# Patient Record
Sex: FEMALE | Race: BLACK OR AFRICAN AMERICAN | NOT HISPANIC OR LATINO | Employment: UNEMPLOYED | URBAN - METROPOLITAN AREA
[De-identification: names, ages, dates, MRNs, and addresses within clinical notes are randomized per-mention and may not be internally consistent; named-entity substitution may affect disease eponyms.]

---
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
## 2021-04-09 ENCOUNTER — OFFICE VISIT (OUTPATIENT)
Dept: URGENT CARE | Facility: CLINIC | Age: 21
End: 2021-04-09
Payer: MEDICAID

## 2021-04-09 VITALS
SYSTOLIC BLOOD PRESSURE: 100 MMHG | HEIGHT: 65 IN | HEART RATE: 80 BPM | OXYGEN SATURATION: 98 % | BODY MASS INDEX: 17.49 KG/M2 | TEMPERATURE: 98 F | WEIGHT: 105 LBS | DIASTOLIC BLOOD PRESSURE: 70 MMHG | RESPIRATION RATE: 16 BRPM

## 2021-04-09 DIAGNOSIS — B37.0 ORAL THRUSH: Primary | ICD-10-CM

## 2021-04-09 DIAGNOSIS — Z76.89 ENCOUNTER TO ESTABLISH CARE: ICD-10-CM

## 2021-04-09 PROCEDURE — 99203 PR OFFICE/OUTPT VISIT, NEW, LEVL III, 30-44 MIN: ICD-10-PCS | Mod: S$GLB,,, | Performed by: NURSE PRACTITIONER

## 2021-04-09 PROCEDURE — 99203 OFFICE O/P NEW LOW 30 MIN: CPT | Mod: S$GLB,,, | Performed by: NURSE PRACTITIONER

## 2021-04-09 RX ORDER — FLUCONAZOLE 200 MG/1
200 TABLET ORAL DAILY
COMMUNITY
Start: 2021-04-06 | End: 2021-06-11

## 2021-04-09 RX ORDER — DROSPIRENONE AND ETHINYL ESTRADIOL 0.03MG-3MG
1 KIT ORAL DAILY
COMMUNITY
Start: 2021-03-01

## 2021-04-09 RX ORDER — FLUCONAZOLE 150 MG/1
150 TABLET ORAL DAILY
Qty: 1 TABLET | Refills: 0 | Status: SHIPPED | OUTPATIENT
Start: 2021-04-09 | End: 2021-04-10

## 2021-04-09 RX ORDER — CLOTRIMAZOLE 10 MG/1
10 LOZENGE ORAL; TOPICAL DAILY
Qty: 15 TABLET | Refills: 0 | Status: SHIPPED | OUTPATIENT
Start: 2021-04-09 | End: 2021-04-24

## 2021-04-20 ENCOUNTER — OFFICE VISIT (OUTPATIENT)
Dept: URGENT CARE | Facility: CLINIC | Age: 21
End: 2021-04-20
Payer: MEDICAID

## 2021-04-20 VITALS
TEMPERATURE: 98 F | OXYGEN SATURATION: 98 % | BODY MASS INDEX: 16.66 KG/M2 | HEART RATE: 100 BPM | RESPIRATION RATE: 18 BRPM | HEIGHT: 65 IN | DIASTOLIC BLOOD PRESSURE: 70 MMHG | SYSTOLIC BLOOD PRESSURE: 126 MMHG | WEIGHT: 100 LBS

## 2021-04-20 DIAGNOSIS — B37.0 ORAL THRUSH: ICD-10-CM

## 2021-04-20 DIAGNOSIS — R45.89 ANXIETY ABOUT HEALTH: Primary | ICD-10-CM

## 2021-04-20 PROCEDURE — 99212 OFFICE O/P EST SF 10 MIN: CPT | Mod: S$GLB,,, | Performed by: NURSE PRACTITIONER

## 2021-04-20 PROCEDURE — 99212 PR OFFICE/OUTPT VISIT, EST, LEVL II, 10-19 MIN: ICD-10-PCS | Mod: S$GLB,,, | Performed by: NURSE PRACTITIONER

## 2021-06-11 ENCOUNTER — HOSPITAL ENCOUNTER (EMERGENCY)
Facility: OTHER | Age: 21
Discharge: HOME | End: 2021-06-11
Attending: EMERGENCY MEDICINE
Payer: MEDICAID

## 2021-06-11 VITALS
WEIGHT: 106 LBS | HEIGHT: 65 IN | SYSTOLIC BLOOD PRESSURE: 100 MMHG | DIASTOLIC BLOOD PRESSURE: 60 MMHG | BODY MASS INDEX: 17.66 KG/M2 | HEART RATE: 70 BPM | RESPIRATION RATE: 20 BRPM | TEMPERATURE: 98 F | OXYGEN SATURATION: 100 %

## 2021-06-11 DIAGNOSIS — B37.0 ORAL THRUSH: Primary | ICD-10-CM

## 2021-06-11 LAB
ALBUMIN SERPL BCP-MCNC: 3.9 G/DL (ref 3.5–5.2)
ALP SERPL-CCNC: 50 U/L (ref 55–135)
ALT SERPL W/O P-5'-P-CCNC: 14 U/L (ref 10–44)
ANION GAP SERPL CALC-SCNC: 9 MMOL/L (ref 8–16)
AST SERPL-CCNC: 17 U/L (ref 10–40)
B-HCG UR QL: NEGATIVE
BASOPHILS # BLD AUTO: 0.03 K/UL (ref 0–0.2)
BASOPHILS NFR BLD: 0.7 % (ref 0–1.9)
BILIRUB SERPL-MCNC: 0.3 MG/DL (ref 0.1–1)
BUN SERPL-MCNC: 11 MG/DL (ref 6–20)
CALCIUM SERPL-MCNC: 8.5 MG/DL (ref 8.7–10.5)
CHLORIDE SERPL-SCNC: 108 MMOL/L (ref 95–110)
CO2 SERPL-SCNC: 24 MMOL/L (ref 23–29)
CREAT SERPL-MCNC: 0.7 MG/DL (ref 0.5–1.4)
CTP QC/QA: YES
DIFFERENTIAL METHOD: ABNORMAL
EOSINOPHIL # BLD AUTO: 0.2 K/UL (ref 0–0.5)
EOSINOPHIL NFR BLD: 4 % (ref 0–8)
ERYTHROCYTE [DISTWIDTH] IN BLOOD BY AUTOMATED COUNT: 13.8 % (ref 11.5–14.5)
EST. GFR  (AFRICAN AMERICAN): >60 ML/MIN/1.73 M^2
EST. GFR  (NON AFRICAN AMERICAN): >60 ML/MIN/1.73 M^2
GLUCOSE SERPL-MCNC: 79 MG/DL (ref 70–110)
HCT VFR BLD AUTO: 32.6 % (ref 37–48.5)
HGB BLD-MCNC: 10.5 G/DL (ref 12–16)
HIV 1+2 AB+HIV1 P24 AG SERPL QL IA: NEGATIVE
IMM GRANULOCYTES # BLD AUTO: 0.01 K/UL (ref 0–0.04)
IMM GRANULOCYTES NFR BLD AUTO: 0.2 % (ref 0–0.5)
LYMPHOCYTES # BLD AUTO: 1.9 K/UL (ref 1–4.8)
LYMPHOCYTES NFR BLD: 42.6 % (ref 18–48)
MCH RBC QN AUTO: 30.5 PG (ref 27–31)
MCHC RBC AUTO-ENTMCNC: 32.2 G/DL (ref 32–36)
MCV RBC AUTO: 95 FL (ref 82–98)
MONOCYTES # BLD AUTO: 0.4 K/UL (ref 0.3–1)
MONOCYTES NFR BLD: 7.8 % (ref 4–15)
NEUTROPHILS # BLD AUTO: 2 K/UL (ref 1.8–7.7)
NEUTROPHILS NFR BLD: 44.7 % (ref 38–73)
NRBC BLD-RTO: 0 /100 WBC
PLATELET # BLD AUTO: 173 K/UL (ref 150–450)
PMV BLD AUTO: 10.3 FL (ref 9.2–12.9)
POTASSIUM SERPL-SCNC: 3.6 MMOL/L (ref 3.5–5.1)
PROT SERPL-MCNC: 7.1 G/DL (ref 6–8.4)
RBC # BLD AUTO: 3.44 M/UL (ref 4–5.4)
SODIUM SERPL-SCNC: 141 MMOL/L (ref 136–145)
WBC # BLD AUTO: 4.48 K/UL (ref 3.9–12.7)

## 2021-06-11 PROCEDURE — 36415 COLL VENOUS BLD VENIPUNCTURE: CPT | Performed by: INTERNAL MEDICINE

## 2021-06-11 PROCEDURE — 80053 COMPREHEN METABOLIC PANEL: CPT | Performed by: INTERNAL MEDICINE

## 2021-06-11 PROCEDURE — 85025 COMPLETE CBC W/AUTO DIFF WBC: CPT | Performed by: INTERNAL MEDICINE

## 2021-06-11 PROCEDURE — 99284 EMERGENCY DEPT VISIT MOD MDM: CPT | Mod: 25

## 2021-06-11 PROCEDURE — 81025 URINE PREGNANCY TEST: CPT | Performed by: INTERNAL MEDICINE

## 2021-06-11 PROCEDURE — 86703 HIV-1/HIV-2 1 RESULT ANTBDY: CPT | Performed by: INTERNAL MEDICINE

## 2021-06-11 PROCEDURE — 63700000 PHARM REV CODE 250 ALT 637 W/O HCPCS: Performed by: INTERNAL MEDICINE

## 2021-06-11 PROCEDURE — 25000003 PHARM REV CODE 250: Performed by: INTERNAL MEDICINE

## 2021-06-11 RX ORDER — NYSTATIN 100000 [USP'U]/ML
500000 SUSPENSION ORAL 2 TIMES DAILY
Qty: 70 ML | Refills: 0 | Status: SHIPPED | OUTPATIENT
Start: 2021-06-11 | End: 2021-06-18

## 2021-06-11 RX ORDER — FLUCONAZOLE 100 MG/1
200 TABLET ORAL
Status: COMPLETED | OUTPATIENT
Start: 2021-06-11 | End: 2021-06-11

## 2021-06-11 RX ORDER — NYSTATIN 100000 [USP'U]/ML
500000 SUSPENSION ORAL
Status: COMPLETED | OUTPATIENT
Start: 2021-06-11 | End: 2021-06-11

## 2021-06-11 RX ORDER — FLUCONAZOLE 200 MG/1
200 TABLET ORAL DAILY
Qty: 7 TABLET | Refills: 0 | Status: SHIPPED | OUTPATIENT
Start: 2021-06-11 | End: 2021-06-18

## 2021-06-11 RX ADMIN — NYSTATIN 500000 UNITS: 100000 SUSPENSION ORAL at 07:06

## 2021-06-11 RX ADMIN — FLUCONAZOLE 200 MG: 100 TABLET ORAL at 07:06

## 2021-06-20 ENCOUNTER — OFFICE VISIT (OUTPATIENT)
Dept: URGENT CARE | Facility: CLINIC | Age: 21
End: 2021-06-20
Payer: MEDICAID

## 2021-06-20 VITALS
HEART RATE: 94 BPM | HEIGHT: 65 IN | WEIGHT: 106 LBS | BODY MASS INDEX: 17.66 KG/M2 | RESPIRATION RATE: 20 BRPM | SYSTOLIC BLOOD PRESSURE: 111 MMHG | TEMPERATURE: 98 F | OXYGEN SATURATION: 97 % | DIASTOLIC BLOOD PRESSURE: 77 MMHG

## 2021-06-20 DIAGNOSIS — B37.0 ORAL THRUSH: ICD-10-CM

## 2021-06-20 DIAGNOSIS — Z76.89 ENCOUNTER TO ESTABLISH CARE: Primary | ICD-10-CM

## 2021-06-20 DIAGNOSIS — R45.89 ANXIETY ABOUT HEALTH: ICD-10-CM

## 2021-06-20 PROCEDURE — 99214 PR OFFICE/OUTPT VISIT, EST, LEVL IV, 30-39 MIN: ICD-10-PCS | Mod: S$GLB,,, | Performed by: NURSE PRACTITIONER

## 2021-06-20 PROCEDURE — 99214 OFFICE O/P EST MOD 30 MIN: CPT | Mod: S$GLB,,, | Performed by: NURSE PRACTITIONER

## 2021-06-26 ENCOUNTER — HOSPITAL ENCOUNTER (EMERGENCY)
Facility: OTHER | Age: 21
Discharge: HOME | End: 2021-06-26
Attending: EMERGENCY MEDICINE
Payer: MEDICAID

## 2021-06-26 VITALS
TEMPERATURE: 97 F | BODY MASS INDEX: 18.99 KG/M2 | HEIGHT: 65 IN | WEIGHT: 114 LBS | SYSTOLIC BLOOD PRESSURE: 124 MMHG | OXYGEN SATURATION: 100 % | RESPIRATION RATE: 18 BRPM | DIASTOLIC BLOOD PRESSURE: 67 MMHG | HEART RATE: 90 BPM

## 2021-06-26 DIAGNOSIS — Z32.02 NEGATIVE PREGNANCY TEST: Primary | ICD-10-CM

## 2021-06-26 LAB
B-HCG UR QL: NEGATIVE
CTP QC/QA: YES

## 2021-06-26 PROCEDURE — 81025 URINE PREGNANCY TEST: CPT | Performed by: EMERGENCY MEDICINE

## 2021-06-26 PROCEDURE — 99282 EMERGENCY DEPT VISIT SF MDM: CPT

## 2021-08-13 ENCOUNTER — OFFICE VISIT (OUTPATIENT)
Dept: URGENT CARE | Facility: CLINIC | Age: 21
End: 2021-08-13
Payer: MEDICAID

## 2021-08-13 VITALS
DIASTOLIC BLOOD PRESSURE: 70 MMHG | HEART RATE: 80 BPM | TEMPERATURE: 98 F | RESPIRATION RATE: 18 BRPM | OXYGEN SATURATION: 98 % | SYSTOLIC BLOOD PRESSURE: 121 MMHG

## 2021-08-13 DIAGNOSIS — B37.0 ORAL CANDIDIASIS: Primary | ICD-10-CM

## 2021-08-13 PROCEDURE — 99213 OFFICE O/P EST LOW 20 MIN: CPT | Mod: S$GLB,,, | Performed by: NURSE PRACTITIONER

## 2021-08-13 PROCEDURE — 99213 PR OFFICE/OUTPT VISIT, EST, LEVL III, 20-29 MIN: ICD-10-PCS | Mod: S$GLB,,, | Performed by: NURSE PRACTITIONER

## 2021-08-13 RX ORDER — FLUCONAZOLE 200 MG/1
200 TABLET ORAL WEEKLY
Qty: 4 TABLET | Refills: 0 | Status: SHIPPED | OUTPATIENT
Start: 2021-08-13 | End: 2021-09-04

## 2021-08-18 ENCOUNTER — TELEPHONE (OUTPATIENT)
Dept: URGENT CARE | Facility: CLINIC | Age: 21
End: 2021-08-18

## 2021-08-18 DIAGNOSIS — K20.90 ESOPHAGITIS: Primary | ICD-10-CM

## 2021-09-25 ENCOUNTER — HOSPITAL ENCOUNTER (EMERGENCY)
Facility: OTHER | Age: 21
Discharge: HOME | End: 2021-09-25
Attending: EMERGENCY MEDICINE
Payer: MEDICAID

## 2021-09-25 VITALS
BODY MASS INDEX: 19.99 KG/M2 | WEIGHT: 120 LBS | DIASTOLIC BLOOD PRESSURE: 83 MMHG | HEART RATE: 95 BPM | RESPIRATION RATE: 18 BRPM | HEIGHT: 65 IN | OXYGEN SATURATION: 98 % | SYSTOLIC BLOOD PRESSURE: 125 MMHG | TEMPERATURE: 99 F

## 2021-09-25 DIAGNOSIS — B37.0 THRUSH: Primary | ICD-10-CM

## 2021-09-25 PROCEDURE — 99284 EMERGENCY DEPT VISIT MOD MDM: CPT

## 2021-09-25 RX ORDER — TRIAMCINOLONE ACETONIDE 1 MG/G
CREAM TOPICAL 2 TIMES DAILY
Qty: 1 TUBE | Refills: 0 | Status: SHIPPED | OUTPATIENT
Start: 2021-09-25 | End: 2021-10-02

## 2021-09-25 RX ORDER — FLUCONAZOLE 100 MG/1
100 TABLET ORAL DAILY
Qty: 7 TABLET | Refills: 0 | Status: SHIPPED | OUTPATIENT
Start: 2021-09-25 | End: 2021-10-02

## 2021-09-25 RX ORDER — TRIAMCINOLONE ACETONIDE 1 MG/G
CREAM TOPICAL 2 TIMES DAILY
Qty: 1 TUBE | Refills: 0 | Status: SHIPPED | OUTPATIENT
Start: 2021-09-25 | End: 2021-09-25 | Stop reason: SDUPTHER

## 2021-09-25 RX ORDER — FLUCONAZOLE 100 MG/1
100 TABLET ORAL DAILY
Qty: 7 TABLET | Refills: 0 | Status: SHIPPED | OUTPATIENT
Start: 2021-09-25 | End: 2021-09-25 | Stop reason: SDUPTHER

## 2022-09-02 ENCOUNTER — HOSPITAL ENCOUNTER (EMERGENCY)
Facility: HOSPITAL | Age: 22
Discharge: HOME | End: 2022-09-02
Attending: EMERGENCY MEDICINE
Payer: MEDICAID

## 2022-09-02 VITALS
OXYGEN SATURATION: 100 % | TEMPERATURE: 99 F | DIASTOLIC BLOOD PRESSURE: 81 MMHG | RESPIRATION RATE: 22 BRPM | HEART RATE: 100 BPM | SYSTOLIC BLOOD PRESSURE: 123 MMHG

## 2022-09-02 DIAGNOSIS — N76.0 BV (BACTERIAL VAGINOSIS): Primary | ICD-10-CM

## 2022-09-02 DIAGNOSIS — B96.89 BV (BACTERIAL VAGINOSIS): Primary | ICD-10-CM

## 2022-09-02 LAB
B-HCG UR QL: NEGATIVE
BACTERIA GENITAL QL WET PREP: ABNORMAL
BILIRUB UR QL STRIP: NEGATIVE
C TRACH DNA SPEC QL NAA+PROBE: NOT DETECTED
CLARITY UR REFRACT.AUTO: CLEAR
CLUE CELLS VAG QL WET PREP: ABNORMAL
COLOR UR AUTO: YELLOW
CTP QC/QA: YES
FILAMENT FUNGI VAG WET PREP-#/AREA: ABNORMAL
GLUCOSE UR QL STRIP: NEGATIVE
HGB UR QL STRIP: NEGATIVE
KETONES UR QL STRIP: NEGATIVE
LEUKOCYTE ESTERASE UR QL STRIP: NEGATIVE
N GONORRHOEA DNA SPEC QL NAA+PROBE: NOT DETECTED
NITRITE UR QL STRIP: NEGATIVE
PH UR STRIP: 7 [PH] (ref 5–8)
PROT UR QL STRIP: NEGATIVE
SP GR UR STRIP: 1.02 (ref 1–1.03)
SPECIMEN SOURCE: ABNORMAL
T VAGINALIS GENITAL QL WET PREP: ABNORMAL
URN SPEC COLLECT METH UR: NORMAL
WBC #/AREA VAG WET PREP: ABNORMAL
YEAST GENITAL QL WET PREP: ABNORMAL

## 2022-09-02 PROCEDURE — 81003 URINALYSIS AUTO W/O SCOPE: CPT | Performed by: EMERGENCY MEDICINE

## 2022-09-02 PROCEDURE — 99284 PR EMERGENCY DEPT VISIT,LEVEL IV: ICD-10-PCS | Mod: ,,, | Performed by: EMERGENCY MEDICINE

## 2022-09-02 PROCEDURE — 87210 SMEAR WET MOUNT SALINE/INK: CPT

## 2022-09-02 PROCEDURE — 87491 CHLMYD TRACH DNA AMP PROBE: CPT

## 2022-09-02 PROCEDURE — 81025 URINE PREGNANCY TEST: CPT | Performed by: EMERGENCY MEDICINE

## 2022-09-02 PROCEDURE — 87591 N.GONORRHOEAE DNA AMP PROB: CPT

## 2022-09-02 PROCEDURE — 99284 EMERGENCY DEPT VISIT MOD MDM: CPT | Mod: ,,, | Performed by: EMERGENCY MEDICINE

## 2022-09-02 PROCEDURE — 99284 EMERGENCY DEPT VISIT MOD MDM: CPT

## 2022-09-02 RX ORDER — METRONIDAZOLE 500 MG/1
500 TABLET ORAL EVERY 12 HOURS
Qty: 14 TABLET | Refills: 0 | Status: SHIPPED | OUTPATIENT
Start: 2022-09-02 | End: 2022-09-09
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
## 2022-09-02 NOTE — DISCHARGE INSTRUCTIONS
Diagnosis:  Bacterial vaginosis    Take the prescribed medicine, called Flagyl or metronidazole, as directed for bacterial vaginosis.  Do not drink alcohol while taking this medication as it can make you violently ill.      Follow-Up Plan:  - Follow-up with primary care doctor within 3 - 5 days  - Additional testing and/or evaluation as directed by your primary doctor    Return to the Emergency Department for symptoms including but not limited to: worsening symptoms, shortness of breath or chest pain, vomiting with inability to hold down fluids, fevers greater than 100.4°F, passing out/fainting/unconsciousness, or other concerning symptoms.

## 2022-09-02 NOTE — ED PROVIDER NOTES
"Encounter Date: 9/2/2022       History     Chief Complaint   Patient presents with    Vaginal Bleeding     Arrived by ems from home c/o gradual worsening vaginal bleeding since 11pm with associated "aching/burning sensation."      Patient presents with:  Vaginal Bleeding: Arrived by ems from home c/o gradual worsening vaginal bleeding since 11pm with associated "aching/burning sensation."       Rebecca Cheek is a 22 y.o. female presenting [place] [facility] [place]uria.  Patient states that pain woke her up from sleep around 11:00 p.m. on 09/01/2022.  Describes a aching/burning sensation in her groin area.  States that she has had minimal spotting of blood and hematuria/dysuria.  Patient denies fevers.  Patient describes right upper quadrant/right lower quadrant pain.  Patient denies any fevers.  Patient has significant concern about how she feels short of breath around vape smoke.  Additionally, patient reports that she gets a lot of yeast infections.  Stating that she will get a yeast infection when she passes by someone smoking weed.      Review of patient's allergies indicates:  No Known Allergies  History reviewed. No pertinent past medical history.  No past surgical history on file.  Family History   Problem Relation Age of Onset    No Known Problems Mother     No Known Problems Father      Social History     Tobacco Use    Smoking status: Never    Smokeless tobacco: Never   Substance Use Topics    Alcohol use: Yes     Review of Systems   Constitutional:  Negative for chills and fever.   HENT:  Negative for congestion, ear pain, rhinorrhea and sore throat.    Eyes:  Negative for visual disturbance.   Respiratory:  Negative for cough, chest tightness and shortness of breath.    Cardiovascular:  Negative for chest pain and leg swelling.   Gastrointestinal:  Positive for nausea. Negative for abdominal distention, abdominal pain, constipation, diarrhea and vomiting.   Endocrine: Negative for polyuria.   Genitourinary:  " Positive for dysuria and hematuria.   Musculoskeletal:  Negative for myalgias.   Skin:  Negative for color change and rash.   Neurological:  Negative for light-headedness and headaches.   Psychiatric/Behavioral:  Negative for decreased concentration. The patient is not nervous/anxious.      Physical Exam     Initial Vitals [09/02/22 0317]   BP Pulse Resp Temp SpO2   123/81 100 (!) 22 99.1 °F (37.3 °C) 100 %      MAP       --         Physical Exam    Constitutional: Vital signs are normal. She appears well-developed and well-nourished. She is cooperative.   HENT:   Head: Normocephalic and atraumatic.   Eyes: Conjunctivae and EOM are normal. Pupils are equal, round, and reactive to light.   Neck: Trachea normal and phonation normal. Neck supple. No tracheal deviation present.   Cardiovascular:  Normal rate, regular rhythm, normal heart sounds, intact distal pulses and normal pulses.     Exam reveals no gallop, no S3, no S4 and no friction rub.       No murmur heard.  Pulmonary/Chest: Breath sounds normal. No respiratory distress. She has no wheezes.   Abdominal: Abdomen is soft. She exhibits no distension. There is no abdominal tenderness.   Genitourinary: Cervix exhibits discharge. Cervix exhibits no motion tenderness. Right adnexum displays no tenderness. Left adnexum displays no tenderness.    Vaginal discharge present.      Genitourinary Comments: Copious thin white exudate coming from the cervical os and around the labia minora.     Musculoskeletal:      Cervical back: Neck supple.      Comments: Extremities atraumatic x4.  Normal gait.  No clubbing, cyanosis, edema.     Neurological: She is alert and oriented to person, place, and time. No cranial nerve deficit or sensory deficit. GCS eye subscore is 4. GCS verbal subscore is 5. GCS motor subscore is 6.   Skin: Skin is warm, dry and intact. Capillary refill takes less than 2 seconds.   Psychiatric: She has a normal mood and affect. Her speech is normal and  behavior is normal. Thought content normal.       ED Course   Procedures  Labs Reviewed   VAGINAL SCREEN - Abnormal; Notable for the following components:       Result Value    Clue Cells Few (*)     Bacteria - Vaginal Screen Moderate (*)     All other components within normal limits    Narrative:     Release to patient->Immediate   C. TRACHOMATIS/N. GONORRHOEAE BY AMP DNA   URINALYSIS, REFLEX TO URINE CULTURE    Narrative:     Specimen Source->Urine   HIV 1 / 2 ANTIBODY   HEPATITIS C ANTIBODY   POCT URINE PREGNANCY          Imaging Results    None          Medications - No data to display  Medical Decision Making:   Initial Assessment:   Rebecca Cheek is a 22 y.o. female presenting to [facility] ED for dysuria.  Initially, patient is hemodynamically stable and clinically well appearing.  Differential Diagnosis:   Yeast infection, STI, UTI  ED Management:  Patient's symptoms of burning in her vaginal area and dysuria are consistent with BV.  Urinalysis is not concerning for UTI.  Patient is not pregnant.  Vaginal screen showing clue cells and few bacteria.  Will prescribe patient prescription for 7 day course of metronidazole.  Counseled patient about not taking metronidazole with alcohol or she will have a severe reaction.  Discussed return precautions, patient is agreeable and voices understanding.  Patient is hemodynamically stable and appropriate for discharge at this time.          Attending Attestation:   Physician Attestation Statement for Resident:  As the supervising MD   Physician Attestation Statement: I have personally seen and examined this patient.   I agree with the above history.  -:   As the supervising MD I agree with the above PE.     As the supervising MD I agree with the above treatment, course, plan, and disposition.    I have reviewed and agree with the residents interpretation of the following: lab data.               ED Course as of 09/08/22 1549   Fri Sep 02, 2022   0540 Urinalysis, Reflex to  Urine Culture Urine, Clean Catch  Normal [ES]   0540 POCT urine pregnancy  Negative [ES]   0625 Vaginal Screen(!)  Vaginal screen is reflective of bacterial vaginosis.  Will treat with metronidazole   [ES]      ED Course User Index  [ES] Angie Lockwood MD               Clinical Impression:   Final diagnoses:  [N76.0, B96.89] BV (bacterial vaginosis) (Primary)      ED Disposition Condition    Discharge Stable          ED Prescriptions       Medication Sig Dispense Start Date End Date Auth. Provider    metroNIDAZOLE (FLAGYL) 500 MG tablet Take 1 tablet (500 mg total) by mouth every 12 (twelve) hours. for 7 days 14 tablet 9/2/2022 9/9/2022 Angie Lockwood MD          Follow-up Information    None          Angie Lockwood MD  Resident  09/02/22 [id number]       Amrik Hartley MD  09/08/22 8130

## 2024-06-15 ENCOUNTER — HOSPITAL ENCOUNTER (EMERGENCY)
Facility: HOSPITAL | Age: 24
Discharge: HOME | End: 2024-06-16
Attending: EMERGENCY MEDICINE
Payer: MEDICAID

## 2024-06-15 VITALS
RESPIRATION RATE: 18 BRPM | SYSTOLIC BLOOD PRESSURE: 125 MMHG | DIASTOLIC BLOOD PRESSURE: 84 MMHG | HEART RATE: 77 BPM | OXYGEN SATURATION: 100 % | TEMPERATURE: 98 F

## 2024-06-15 DIAGNOSIS — N92.6 ABNORMAL BLEEDING IN MENSTRUAL CYCLE: Primary | ICD-10-CM

## 2024-06-15 LAB
ALBUMIN SERPL BCP-MCNC: 4 G/DL (ref 3.5–5.2)
ALP SERPL-CCNC: 60 U/L (ref 55–135)
ALT SERPL W/O P-5'-P-CCNC: 27 U/L (ref 10–44)
ANION GAP SERPL CALC-SCNC: 11 MMOL/L (ref 8–16)
AST SERPL-CCNC: 52 U/L (ref 10–40)
B-HCG UR QL: NEGATIVE
BASOPHILS # BLD AUTO: 0.03 K/UL (ref 0–0.2)
BASOPHILS NFR BLD: 0.5 % (ref 0–1.9)
BILIRUB SERPL-MCNC: 0.5 MG/DL (ref 0.1–1)
BUN SERPL-MCNC: 9 MG/DL (ref 6–20)
CALCIUM SERPL-MCNC: 9.5 MG/DL (ref 8.7–10.5)
CHLORIDE SERPL-SCNC: 106 MMOL/L (ref 95–110)
CO2 SERPL-SCNC: 20 MMOL/L (ref 23–29)
CREAT SERPL-MCNC: 0.8 MG/DL (ref 0.5–1.4)
CTP QC/QA: YES
DIFFERENTIAL METHOD BLD: ABNORMAL
EOSINOPHIL # BLD AUTO: 0.2 K/UL (ref 0–0.5)
EOSINOPHIL NFR BLD: 3.3 % (ref 0–8)
ERYTHROCYTE [DISTWIDTH] IN BLOOD BY AUTOMATED COUNT: 12.6 % (ref 11.5–14.5)
EST. GFR  (NO RACE VARIABLE): >60 ML/MIN/1.73 M^2
GLUCOSE SERPL-MCNC: 75 MG/DL (ref 70–110)
HCT VFR BLD AUTO: 36.7 % (ref 37–48.5)
HCV AB SERPL QL IA: NORMAL
HGB BLD-MCNC: 12.3 G/DL (ref 12–16)
HIV 1+2 AB+HIV1 P24 AG SERPL QL IA: NORMAL
IMM GRANULOCYTES # BLD AUTO: 0.01 K/UL (ref 0–0.04)
IMM GRANULOCYTES NFR BLD AUTO: 0.2 % (ref 0–0.5)
LYMPHOCYTES # BLD AUTO: 1.8 K/UL (ref 1–4.8)
LYMPHOCYTES NFR BLD: 31.1 % (ref 18–48)
MCH RBC QN AUTO: 29.9 PG (ref 27–31)
MCHC RBC AUTO-ENTMCNC: 33.5 G/DL (ref 32–36)
MCV RBC AUTO: 89 FL (ref 82–98)
MONOCYTES # BLD AUTO: 0.3 K/UL (ref 0.3–1)
MONOCYTES NFR BLD: 5.3 % (ref 4–15)
NEUTROPHILS # BLD AUTO: 3.4 K/UL (ref 1.8–7.7)
NEUTROPHILS NFR BLD: 59.6 % (ref 38–73)
NRBC BLD-RTO: 0 /100 WBC
PLATELET # BLD AUTO: 239 K/UL (ref 150–450)
PMV BLD AUTO: 10.5 FL (ref 9.2–12.9)
POTASSIUM SERPL-SCNC: 3.8 MMOL/L (ref 3.5–5.1)
PROT SERPL-MCNC: 7.5 G/DL (ref 6–8.4)
RBC # BLD AUTO: 4.11 M/UL (ref 4–5.4)
SODIUM SERPL-SCNC: 137 MMOL/L (ref 136–145)
WBC # BLD AUTO: 5.7 K/UL (ref 3.9–12.7)

## 2024-06-15 PROCEDURE — 86803 HEPATITIS C AB TEST: CPT | Performed by: PHYSICIAN ASSISTANT

## 2024-06-15 PROCEDURE — 99283 EMERGENCY DEPT VISIT LOW MDM: CPT

## 2024-06-15 PROCEDURE — 85025 COMPLETE CBC W/AUTO DIFF WBC: CPT | Performed by: EMERGENCY MEDICINE

## 2024-06-15 PROCEDURE — 80053 COMPREHEN METABOLIC PANEL: CPT | Performed by: EMERGENCY MEDICINE

## 2024-06-15 PROCEDURE — 87389 HIV-1 AG W/HIV-1&-2 AB AG IA: CPT | Performed by: PHYSICIAN ASSISTANT

## 2024-06-15 PROCEDURE — 81025 URINE PREGNANCY TEST: CPT | Performed by: EMERGENCY MEDICINE

## 2024-06-16 NOTE — ED PROVIDER NOTES
Encounter Date: 6/15/2024       History     Chief Complaint   Patient presents with    Vaginal Bleeding     Had intercourse yesterday and and has been experiencing vaginal bleeding since this morning. LMP on 6/6/24. Hx of anemia     This patient is a 23-year-old female without significant past medical history presenting to the emergency department with complaints of abnormal vaginal bleeding.  She reports she completed what she thought was a full cycle 4 or 5 days ago but has begun bleeding again like normal menstruation for the last 2 days.  She denies associated chest pain, difficulty breathing, urinary symptoms, vaginal discharge.  She reports she does not take birth control.  She reports she had a similar recurrence 4 months ago and visited her primary care doctor who told her that hormonal changes, stress, dietary changes can sometimes induce multiple menstrual cycles or atypical pattern.  She denies knowledge that she was acutely anemic.  She reports that she noticed the bleeding after intercourse 2 nights ago.      Review of patient's allergies indicates:  No Known Allergies  History reviewed. No pertinent past medical history.  History reviewed. No pertinent surgical history.  Family History   Problem Relation Name Age of Onset    No Known Problems Mother      No Known Problems Father       Social History     Tobacco Use    Smoking status: Never    Smokeless tobacco: Never   Substance Use Topics    Alcohol use: Yes     Review of Systems   Genitourinary:  Positive for vaginal bleeding.       Physical Exam     Initial Vitals [06/15/24 2032]   BP Pulse Resp Temp SpO2   130/84 102 18 99 °F (37.2 °C) 99 %      MAP       --         Physical Exam    Nursing note and vitals reviewed.  Constitutional: Vital signs are normal. She appears well-nourished.   HENT:   Head: Normocephalic and atraumatic.   Eyes: Conjunctivae and EOM are normal.   Neck: Neck supple. No thyroid mass present.   Normal range of  motion.  Cardiovascular:  Normal rate, regular rhythm and normal heart sounds.     Exam reveals no gallop and no friction rub.       No murmur heard.  Pulmonary/Chest: Breath sounds normal. She has no wheezes. She has no rhonchi. She has no rales.   Abdominal: Abdomen is soft. Bowel sounds are normal. There is no abdominal tenderness.   Genitourinary:    Vagina normal.      Genitourinary Comments: Female chaperone speculum exam reveals normal external vaginal architecture, dark clotted blood in the vaginal vault, normal cervical close, no appreciable vaginal discharge, no CMT     Musculoskeletal:         General: Normal range of motion.      Cervical back: Normal range of motion and neck supple.     Neurological: She is alert and oriented to person, place, and time. She has normal strength. No cranial nerve deficit or sensory deficit.   Skin: Skin is warm and dry. No rash noted. No erythema.   Psychiatric: She has a normal mood and affect. Her speech is normal. Cognition and memory are normal.         ED Course   Procedures  Labs Reviewed   CBC W/ AUTO DIFFERENTIAL - Abnormal; Notable for the following components:       Result Value    Hematocrit 36.7 (*)     All other components within normal limits   COMPREHENSIVE METABOLIC PANEL - Abnormal; Notable for the following components:    CO2 20 (*)     AST 52 (*)     All other components within normal limits   HIV 1 / 2 ANTIBODY    Narrative:     Release to patient->Immediate   HEPATITIS C ANTIBODY    Narrative:     Release to patient->Immediate   POCT URINE PREGNANCY          Imaging Results    None          Medications - No data to display  Medical Decision Making  This patient was emergently assessed shortly after arrival.  Initial vital signs are stable.  Patient has no abdominal pain or pelvic pain.  Non concerning vaginal speculum examination.  H&H are normal and stable.  CMP largely unremarkable.  I do not think advanced imaging including pelvic ultrasound are  currently warranted.  No concern for STI.  I do not think contributing malignancy at this time.  Patient educated I suspect irregular menstrual cycle breakthrough as the cause for current complaints.  She is educated about supportive care and will be asked to follow up with her primary care doctor her OBGYN as soon as possible.  She is asked to monitor symptoms closely at home and return to the emergency department immediately for any new, concerning, or worsening symptoms.  Patient was agreeable with this plan and was discharged in stable condition.    Amount and/or Complexity of Data Reviewed  Labs: ordered.                                      Clinical Impression:  Final diagnoses:  [N92.6] Abnormal bleeding in menstrual cycle (Primary)          ED Disposition Condition    Discharge Stable          ED Prescriptions    None       Follow-up Information       Follow up With Specialties Details Why Contact Info    Dante Machuca  Schedule an appointment as soon as possible for a visit   [address]  [facility] [address]  [phone]               Eric Hernandez MD  06/16/24 2027

## 2024-06-16 NOTE — ED TRIAGE NOTES
Rebecca Cheek, an 23 y.o. female presents to the ED after unprotected intercourse yesterday. Pt states heavy bright red vaginal bleeding and worsening weakness since. Pt states she has a hx of Anemia. Pt also wants to be swabbed for STI's.      Chief Complaint   Patient presents with    Vaginal Bleeding     Had intercourse yesterday and and has been experiencing vaginal bleeding since this morning. LMP on 6/6/24. Hx of anemia     Review of patient's allergies indicates:  No Known Allergies  History reviewed. No pertinent past medical history.

## 2024-07-06 ENCOUNTER — HOSPITAL ENCOUNTER (EMERGENCY)
Facility: HOSPITAL | Age: 24
Discharge: HOME | End: 2024-07-06
Attending: EMERGENCY MEDICINE
Payer: MEDICAID

## 2024-07-06 VITALS
HEART RATE: 85 BPM | BODY MASS INDEX: 19.99 KG/M2 | DIASTOLIC BLOOD PRESSURE: 66 MMHG | SYSTOLIC BLOOD PRESSURE: 103 MMHG | RESPIRATION RATE: 20 BRPM | HEIGHT: 65 IN | OXYGEN SATURATION: 100 % | WEIGHT: 120 LBS | TEMPERATURE: 99 F

## 2024-07-06 DIAGNOSIS — F41.0 PANIC ATTACK: Primary | ICD-10-CM

## 2024-07-06 DIAGNOSIS — R00.2 PALPITATION: ICD-10-CM

## 2024-07-06 LAB
ANION GAP SERPL CALC-SCNC: 8 MMOL/L (ref 8–16)
BASOPHILS # BLD AUTO: 0.03 K/UL (ref 0–0.2)
BASOPHILS NFR BLD: 0.6 % (ref 0–1.9)
BUN SERPL-MCNC: 12 MG/DL (ref 6–20)
CALCIUM SERPL-MCNC: 8.9 MG/DL (ref 8.7–10.5)
CHLORIDE SERPL-SCNC: 109 MMOL/L (ref 95–110)
CO2 SERPL-SCNC: 22 MMOL/L (ref 23–29)
CREAT SERPL-MCNC: 0.7 MG/DL (ref 0.5–1.4)
DIFFERENTIAL METHOD BLD: ABNORMAL
EOSINOPHIL # BLD AUTO: 0.4 K/UL (ref 0–0.5)
EOSINOPHIL NFR BLD: 8.3 % (ref 0–8)
ERYTHROCYTE [DISTWIDTH] IN BLOOD BY AUTOMATED COUNT: 12.9 % (ref 11.5–14.5)
EST. GFR  (NO RACE VARIABLE): >60 ML/MIN/1.73 M^2
GLUCOSE SERPL-MCNC: 76 MG/DL (ref 70–110)
HCT VFR BLD AUTO: 37.5 % (ref 37–48.5)
HGB BLD-MCNC: 12.1 G/DL (ref 12–16)
IMM GRANULOCYTES # BLD AUTO: 0.01 K/UL (ref 0–0.04)
IMM GRANULOCYTES NFR BLD AUTO: 0.2 % (ref 0–0.5)
LYMPHOCYTES # BLD AUTO: 2 K/UL (ref 1–4.8)
LYMPHOCYTES NFR BLD: 43.2 % (ref 18–48)
MCH RBC QN AUTO: 30.1 PG (ref 27–31)
MCHC RBC AUTO-ENTMCNC: 32.3 G/DL (ref 32–36)
MCV RBC AUTO: 93 FL (ref 82–98)
MONOCYTES # BLD AUTO: 0.3 K/UL (ref 0.3–1)
MONOCYTES NFR BLD: 5.8 % (ref 4–15)
NEUTROPHILS # BLD AUTO: 2 K/UL (ref 1.8–7.7)
NEUTROPHILS NFR BLD: 41.9 % (ref 38–73)
NRBC BLD-RTO: 0 /100 WBC
PLATELET # BLD AUTO: 244 K/UL (ref 150–450)
PMV BLD AUTO: 10.9 FL (ref 9.2–12.9)
POTASSIUM SERPL-SCNC: 3.8 MMOL/L (ref 3.5–5.1)
RBC # BLD AUTO: 4.02 M/UL (ref 4–5.4)
SODIUM SERPL-SCNC: 139 MMOL/L (ref 136–145)
WBC # BLD AUTO: 4.68 K/UL (ref 3.9–12.7)

## 2024-07-06 PROCEDURE — 85025 COMPLETE CBC W/AUTO DIFF WBC: CPT | Performed by: EMERGENCY MEDICINE

## 2024-07-06 PROCEDURE — 99284 EMERGENCY DEPT VISIT MOD MDM: CPT | Mod: 25

## 2024-07-06 PROCEDURE — 93005 ELECTROCARDIOGRAM TRACING: CPT

## 2024-07-06 PROCEDURE — 80048 BASIC METABOLIC PNL TOTAL CA: CPT | Performed by: EMERGENCY MEDICINE

## 2024-07-06 PROCEDURE — 93010 ELECTROCARDIOGRAM REPORT: CPT | Mod: ,,, | Performed by: INTERNAL MEDICINE

## 2024-07-06 PROCEDURE — 25000003 PHARM REV CODE 250: Performed by: EMERGENCY MEDICINE

## 2024-07-06 RX ORDER — ALPRAZOLAM 0.25 MG/1
0.5 TABLET ORAL
Status: COMPLETED | OUTPATIENT
Start: 2024-07-06 | End: 2024-07-06

## 2024-07-06 RX ORDER — HYDROXYZINE HYDROCHLORIDE 25 MG/1
25 TABLET, FILM COATED ORAL 2 TIMES DAILY PRN
Qty: 30 TABLET | Refills: 0 | Status: SHIPPED | OUTPATIENT
Start: 2024-07-06 | End: 2024-08-05

## 2024-07-06 RX ORDER — HYDROXYZINE HYDROCHLORIDE 25 MG/1
25 TABLET, FILM COATED ORAL 2 TIMES DAILY PRN
Qty: 30 TABLET | Refills: 0 | Status: SHIPPED | OUTPATIENT
Start: 2024-07-06 | End: 2024-07-06

## 2024-07-06 RX ADMIN — ALPRAZOLAM 0.5 MG: 0.25 TABLET ORAL at 10:07

## 2024-07-06 NOTE — ED PROVIDER NOTES
Encounter Date: 7/6/2024       History     Chief Complaint   Patient presents with    Anxiety     Rebecca Cheek year old female with past medical history of PTSD, previous homelessness currently living in apartment presenting today for anxiety.  Symptoms started yesterday.  She says that she was previously homeless and living your [place].  When she goes to those areas it triggers her PTSD.  Yesterday, she had to go to the library in the 1 near her house was closed.  She went to a library near [place] it started .  This morning she woke up uncontrollably shaking, crying and then realized she did not eat yesterday- which she relates to when she was homeless.  She felt like she did not have control over her body.  She reported chest tightness and palpitations.  She decided to call EMS to come to emergency department.  She states she was previously on Zoloft but took it for a week and then stopped.  She has not been on any other medications.  She has not had therapy.  She denies SI, HI.  She was trying to look for a job to help pay for her apartment.      Review of patient's allergies indicates:  No Known Allergies  No past medical history on file.  No past surgical history on file.  Family History   Problem Relation Name Age of Onset    No Known Problems Mother      No Known Problems Father       Social History     Tobacco Use    Smoking status: Never    Smokeless tobacco: Never   Substance Use Topics    Alcohol use: Yes     Review of Systems    Physical Exam     Initial Vitals [07/06/24 0919]   BP Pulse Resp Temp SpO2   (!) 88/62 90 15 98.4 °F (36.9 °C) 100 %      MAP       --         Physical Exam    Nursing note and vitals reviewed.  Constitutional: She appears well-developed and well-nourished. No distress.   HENT:   Mouth/Throat: Oropharynx is clear and moist.   Eyes: Conjunctivae are normal. No scleral icterus.   Neck: No JVD present.   Cardiovascular:  Normal rate, regular rhythm and intact distal pulses.            Pulmonary/Chest: Breath sounds normal. No respiratory distress. She has no wheezes. She has no rales.   Abdominal: Abdomen is soft. Bowel sounds are normal. She exhibits no distension. There is no abdominal tenderness. There is no rebound.   Musculoskeletal:         General: No edema.     Lymphadenopathy:     She has no cervical adenopathy.   Neurological: She is alert and oriented to person, place, and time.   Skin: Skin is warm. No rash noted.   Psychiatric: Her speech is normal and behavior is normal. Judgment and thought content normal. Her mood appears anxious. Blunt: interminttent tearful. Thought content is not paranoid. Cognition and memory are normal. She exhibits a depressed mood. She expresses no homicidal and no suicidal ideation.         ED Course   Procedures  Labs Reviewed   CBC W/ AUTO DIFFERENTIAL - Abnormal; Notable for the following components:       Result Value    Eosinophil % 8.3 (*)     All other components within normal limits   BASIC METABOLIC PANEL - Abnormal; Notable for the following components:    CO2 22 (*)     All other components within normal limits          Imaging Results    None          Medications   ALPRAZolam tablet 0.5 mg (0.5 mg Oral Given 7/6/24 1013)     Medical Decision Making  Urgent evaluation a 23-year-old female presenting today for, palpitations and chest tightness in the setting of PTSD    Hypotensive otherwise vital signs are stable.    Repeat blood pressure improved without intervention, likely BP    Differential includes but not limited to dehydration, electrolyte derangement, panic attack, PTSD, less likely infectious process    Plan for labs, EKG, cardiac monitoring, oral fluids and diet    Amount and/or Complexity of Data Reviewed  Labs: ordered. Decision-making details documented in ED Course.    Risk  Prescription drug management.               ED Course as of 07/06/24 1235   Sat Jul 06, 2024   1045 WBC: 4.68 [GM]   1045 Glucose: 76 [GM]   1045 BUN:  12 [GM]   1045 Creatinine: 0.7 [GM]   1045 Hemoglobin: 12.1 [GM]   1101 Patient re-evaluated, reports significant improvement of symptoms.       I have reviewed her medical record, she was previously on Zoloft and there are refills available.    I have recommended she start taking it again.  Patient was also seen by social work, provided with resources for outpatient therapy, psychiatry.     [GM]      ED Course User Index  [GM] Annie Patterson MD                           Clinical Impression:  Final diagnoses:  [R00.2] Palpitation  [F41.0] Panic attack (Primary)          ED Disposition Condition    Discharge Stable          ED Prescriptions       Medication Sig Dispense Start Date End Date Auth. Provider    hydrOXYzine HCL (ATARAX) 25 MG tablet  (Status: Discontinued) Take 1 tablet (25 mg total) by mouth 2 (two) times daily as needed for Anxiety. 30 tablet 7/6/2024 7/6/2024 Annie Patterson MD    hydrOXYzine HCL (ATARAX) 25 MG tablet Take 1 tablet (25 mg total) by mouth 2 (two) times daily as needed for Anxiety. 30 tablet 7/6/2024 8/5/2024 Annie Patterson MD          Follow-up Information       Follow up With Specialties Details Why Contact Info    [facility]  Schedule an appointment as soon as possible for a visit   [address]  [facility] [address]  [phone]               Annie Patterson MD  07/06/24 0198

## 2024-07-06 NOTE — DISCHARGE INSTRUCTIONS
Labs are unremarkable.  Please follow-up with outpatient resources provided for psychiatry and therapy.  I believe this will both be you at beneficial.  You also have prescription refills remaining on your Zoloft.  I suggest that you restart your medication and take it as prescribed.

## 2024-07-06 NOTE — PLAN OF CARE
SW provided pt with Mental Health resources and Psychology Today website information        Gracie Short CD, MSW, LMSW, RSW   Case Management  [facility]  Email: [email or web address]

## 2024-07-06 NOTE — ED NOTES
"Pt c/o anxiety episode this morning. Pt was getting up from couch to bathroom and pt states "I felt like I lost control of my body." Pt states she experienced tremors, tightness in the chest, and SOB.  "

## 2024-07-07 LAB
OHS QRS DURATION: 64 MS
OHS QTC CALCULATION: 442 MS

## 2024-07-27 ENCOUNTER — HOSPITAL ENCOUNTER (EMERGENCY)
Facility: HOSPITAL | Age: 24
Discharge: HOME | End: 2024-07-27
Attending: STUDENT IN AN ORGANIZED HEALTH CARE EDUCATION/TRAINING PROGRAM
Payer: MEDICAID

## 2024-07-27 VITALS
SYSTOLIC BLOOD PRESSURE: 101 MMHG | DIASTOLIC BLOOD PRESSURE: 56 MMHG | BODY MASS INDEX: 19.99 KG/M2 | HEART RATE: 78 BPM | OXYGEN SATURATION: 98 % | HEIGHT: 65 IN | WEIGHT: 120 LBS | TEMPERATURE: 98 F | RESPIRATION RATE: 16 BRPM

## 2024-07-27 DIAGNOSIS — M79.10 MYALGIA: Primary | ICD-10-CM

## 2024-07-27 DIAGNOSIS — D64.9 ANEMIA, UNSPECIFIED TYPE: ICD-10-CM

## 2024-07-27 PROBLEM — K13.0 CHEILOSIS: Status: ACTIVE | Noted: 2021-03-20

## 2024-07-27 PROBLEM — F23 ACUTE PSYCHOSIS: Status: ACTIVE | Noted: 2023-05-12

## 2024-07-27 LAB
ALBUMIN SERPL BCP-MCNC: 3.9 G/DL (ref 3.5–5.2)
ALP SERPL-CCNC: 55 U/L (ref 55–135)
ALT SERPL W/O P-5'-P-CCNC: 11 U/L (ref 10–44)
ANION GAP SERPL CALC-SCNC: 4 MMOL/L (ref 8–16)
AST SERPL-CCNC: 15 U/L (ref 10–40)
B-HCG UR QL: NEGATIVE
BASOPHILS # BLD AUTO: 0.03 K/UL (ref 0–0.2)
BASOPHILS NFR BLD: 0.4 % (ref 0–1.9)
BILIRUB SERPL-MCNC: 0.1 MG/DL (ref 0.1–1)
BUN SERPL-MCNC: 12 MG/DL (ref 6–20)
CALCIUM SERPL-MCNC: 8.6 MG/DL (ref 8.7–10.5)
CHLORIDE SERPL-SCNC: 108 MMOL/L (ref 95–110)
CO2 SERPL-SCNC: 23 MMOL/L (ref 23–29)
CREAT SERPL-MCNC: 0.8 MG/DL (ref 0.5–1.4)
CTP QC/QA: YES
DIFFERENTIAL METHOD BLD: ABNORMAL
EOSINOPHIL # BLD AUTO: 0.6 K/UL (ref 0–0.5)
EOSINOPHIL NFR BLD: 8.9 % (ref 0–8)
ERYTHROCYTE [DISTWIDTH] IN BLOOD BY AUTOMATED COUNT: 12.6 % (ref 11.5–14.5)
EST. GFR  (NO RACE VARIABLE): >60 ML/MIN/1.73 M^2
GLUCOSE SERPL-MCNC: 77 MG/DL (ref 70–110)
HCT VFR BLD AUTO: 32.5 % (ref 37–48.5)
HGB BLD-MCNC: 10.5 G/DL (ref 12–16)
IMM GRANULOCYTES # BLD AUTO: 0.01 K/UL (ref 0–0.04)
IMM GRANULOCYTES NFR BLD AUTO: 0.1 % (ref 0–0.5)
LYMPHOCYTES # BLD AUTO: 2.6 K/UL (ref 1–4.8)
LYMPHOCYTES NFR BLD: 36.2 % (ref 18–48)
MCH RBC QN AUTO: 29.4 PG (ref 27–31)
MCHC RBC AUTO-ENTMCNC: 32.3 G/DL (ref 32–36)
MCV RBC AUTO: 91 FL (ref 82–98)
MONOCYTES # BLD AUTO: 0.4 K/UL (ref 0.3–1)
MONOCYTES NFR BLD: 6.3 % (ref 4–15)
NEUTROPHILS # BLD AUTO: 3.4 K/UL (ref 1.8–7.7)
NEUTROPHILS NFR BLD: 48.1 % (ref 38–73)
NRBC BLD-RTO: 0 /100 WBC
PLATELET # BLD AUTO: 210 K/UL (ref 150–450)
PMV BLD AUTO: 10.7 FL (ref 9.2–12.9)
POTASSIUM SERPL-SCNC: 3.9 MMOL/L (ref 3.5–5.1)
PROT SERPL-MCNC: 6.9 G/DL (ref 6–8.4)
RBC # BLD AUTO: 3.57 M/UL (ref 4–5.4)
SODIUM SERPL-SCNC: 135 MMOL/L (ref 136–145)
WBC # BLD AUTO: 7.04 K/UL (ref 3.9–12.7)

## 2024-07-27 PROCEDURE — 85025 COMPLETE CBC W/AUTO DIFF WBC: CPT | Performed by: PHYSICIAN ASSISTANT

## 2024-07-27 PROCEDURE — 99284 EMERGENCY DEPT VISIT MOD MDM: CPT | Mod: 25

## 2024-07-27 PROCEDURE — 63600175 PHARM REV CODE 636 W HCPCS: Performed by: PHYSICIAN ASSISTANT

## 2024-07-27 PROCEDURE — 81025 URINE PREGNANCY TEST: CPT | Performed by: PHYSICIAN ASSISTANT

## 2024-07-27 PROCEDURE — 80053 COMPREHEN METABOLIC PANEL: CPT | Performed by: PHYSICIAN ASSISTANT

## 2024-07-27 PROCEDURE — 96360 HYDRATION IV INFUSION INIT: CPT

## 2024-07-27 RX ORDER — KETOROLAC TROMETHAMINE 30 MG/ML
10 INJECTION, SOLUTION INTRAMUSCULAR; INTRAVENOUS
Status: DISCONTINUED | OUTPATIENT
Start: 2024-07-27 | End: 2024-07-27

## 2024-07-27 RX ORDER — SERTRALINE HYDROCHLORIDE 50 MG/1
TABLET, FILM COATED ORAL
COMMUNITY
Start: 2024-04-02 | End: 2024-07-27 | Stop reason: CLARIF

## 2024-07-27 RX ORDER — ACETAMINOPHEN 500 MG
1000 TABLET ORAL
Status: DISCONTINUED | OUTPATIENT
Start: 2024-07-27 | End: 2024-07-27

## 2024-07-27 RX ADMIN — SODIUM CHLORIDE, POTASSIUM CHLORIDE, SODIUM LACTATE AND CALCIUM CHLORIDE 1000 ML: 600; 310; 30; 20 INJECTION, SOLUTION INTRAVENOUS at 08:07
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
## 2024-07-28 NOTE — DISCHARGE INSTRUCTIONS
Diagnosis: Body pain, mild anemia    Your lab tests showed mild anemia but no other concerning findings. You should take Tylenol as needed for pain up to 3 grams daily which is 6 of the 500 mg extra strength tablets.    Please schedule an appointment with your primary care doctor for follow-up. If you start to have any new or worsening symptoms, please come back to the emergency department.
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
## 2024-11-01 ENCOUNTER — HOSPITAL ENCOUNTER (EMERGENCY)
Facility: HOSPITAL | Age: 24
Discharge: HOME | End: 2024-11-01
Attending: STUDENT IN AN ORGANIZED HEALTH CARE EDUCATION/TRAINING PROGRAM
Payer: MEDICAID

## 2024-11-01 VITALS
RESPIRATION RATE: 16 BRPM | HEIGHT: 65 IN | HEART RATE: 71 BPM | TEMPERATURE: 98 F | BODY MASS INDEX: 19.99 KG/M2 | OXYGEN SATURATION: 98 % | SYSTOLIC BLOOD PRESSURE: 105 MMHG | DIASTOLIC BLOOD PRESSURE: 68 MMHG | WEIGHT: 120 LBS

## 2024-11-01 DIAGNOSIS — R53.83 FATIGUE, UNSPECIFIED TYPE: Primary | ICD-10-CM

## 2024-11-01 LAB
ALBUMIN SERPL BCP-MCNC: 4.2 G/DL (ref 3.5–5.2)
ALP SERPL-CCNC: 53 U/L (ref 40–150)
ALT SERPL W/O P-5'-P-CCNC: 12 U/L (ref 10–44)
ANION GAP SERPL CALC-SCNC: 7 MMOL/L (ref 8–16)
AST SERPL-CCNC: 19 U/L (ref 10–40)
B-HCG UR QL: NEGATIVE
BASOPHILS # BLD AUTO: 0.02 K/UL (ref 0–0.2)
BASOPHILS NFR BLD: 0.5 % (ref 0–1.9)
BILIRUB SERPL-MCNC: 0.4 MG/DL (ref 0.1–1)
BILIRUB UR QL STRIP: NEGATIVE
BUN SERPL-MCNC: 12 MG/DL (ref 6–20)
BUN SERPL-MCNC: 12 MG/DL (ref 6–30)
CALCIUM SERPL-MCNC: 9.7 MG/DL (ref 8.7–10.5)
CHLORIDE SERPL-SCNC: 106 MMOL/L (ref 95–110)
CHLORIDE SERPL-SCNC: 108 MMOL/L (ref 95–110)
CLARITY UR REFRACT.AUTO: CLEAR
CO2 SERPL-SCNC: 21 MMOL/L (ref 23–29)
COLOR UR AUTO: YELLOW
CREAT SERPL-MCNC: 0.6 MG/DL (ref 0.5–1.4)
CREAT SERPL-MCNC: 0.8 MG/DL (ref 0.5–1.4)
CTP QC/QA: YES
DIFFERENTIAL METHOD BLD: ABNORMAL
EOSINOPHIL # BLD AUTO: 0.1 K/UL (ref 0–0.5)
EOSINOPHIL NFR BLD: 3.2 % (ref 0–8)
ERYTHROCYTE [DISTWIDTH] IN BLOOD BY AUTOMATED COUNT: 11.3 % (ref 11.5–14.5)
EST. GFR  (NO RACE VARIABLE): >60 ML/MIN/1.73 M^2
GLUCOSE SERPL-MCNC: 74 MG/DL (ref 70–110)
GLUCOSE SERPL-MCNC: 78 MG/DL (ref 70–110)
GLUCOSE UR QL STRIP: NEGATIVE
HCT VFR BLD AUTO: 37.6 % (ref 37–48.5)
HCT VFR BLD CALC: 34 %PCV (ref 36–54)
HGB BLD-MCNC: 12.5 G/DL (ref 12–16)
HGB UR QL STRIP: NEGATIVE
IMM GRANULOCYTES # BLD AUTO: 0.01 K/UL (ref 0–0.04)
IMM GRANULOCYTES NFR BLD AUTO: 0.2 % (ref 0–0.5)
KETONES UR QL STRIP: ABNORMAL
LEUKOCYTE ESTERASE UR QL STRIP: NEGATIVE
LYMPHOCYTES # BLD AUTO: 1.4 K/UL (ref 1–4.8)
LYMPHOCYTES NFR BLD: 32.4 % (ref 18–48)
MAGNESIUM SERPL-MCNC: 2 MG/DL (ref 1.6–2.6)
MCH RBC QN AUTO: 30.7 PG (ref 27–31)
MCHC RBC AUTO-ENTMCNC: 33.2 G/DL (ref 32–36)
MCV RBC AUTO: 92 FL (ref 82–98)
MONOCYTES # BLD AUTO: 0.3 K/UL (ref 0.3–1)
MONOCYTES NFR BLD: 6.6 % (ref 4–15)
NEUTROPHILS # BLD AUTO: 2.5 K/UL (ref 1.8–7.7)
NEUTROPHILS NFR BLD: 57.1 % (ref 38–73)
NITRITE UR QL STRIP: NEGATIVE
NRBC BLD-RTO: 0 /100 WBC
PH UR STRIP: 6 [PH] (ref 5–8)
PLATELET # BLD AUTO: 226 K/UL (ref 150–450)
PMV BLD AUTO: 10.5 FL (ref 9.2–12.9)
POC IONIZED CALCIUM: 1.25 MMOL/L (ref 1.06–1.42)
POC TCO2 (MEASURED): 20 MMOL/L (ref 23–29)
POTASSIUM BLD-SCNC: 3.8 MMOL/L (ref 3.5–5.1)
POTASSIUM SERPL-SCNC: 4.2 MMOL/L (ref 3.5–5.1)
PROT SERPL-MCNC: 7.5 G/DL (ref 6–8.4)
PROT UR QL STRIP: ABNORMAL
RBC # BLD AUTO: 4.07 M/UL (ref 4–5.4)
SAMPLE: ABNORMAL
SARS-COV-2 RDRP RESP QL NAA+PROBE: NEGATIVE
SODIUM BLD-SCNC: 140 MMOL/L (ref 136–145)
SODIUM SERPL-SCNC: 136 MMOL/L (ref 136–145)
SP GR UR STRIP: >=1.03 (ref 1–1.03)
TSH SERPL DL<=0.005 MIU/L-ACNC: 0.53 UIU/ML (ref 0.4–4)
URN SPEC COLLECT METH UR: ABNORMAL
WBC # BLD AUTO: 4.38 K/UL (ref 3.9–12.7)

## 2024-11-01 PROCEDURE — 87635 SARS-COV-2 COVID-19 AMP PRB: CPT | Performed by: STUDENT IN AN ORGANIZED HEALTH CARE EDUCATION/TRAINING PROGRAM

## 2024-11-01 PROCEDURE — 83735 ASSAY OF MAGNESIUM: CPT | Performed by: STUDENT IN AN ORGANIZED HEALTH CARE EDUCATION/TRAINING PROGRAM

## 2024-11-01 PROCEDURE — 85025 COMPLETE CBC W/AUTO DIFF WBC: CPT | Performed by: STUDENT IN AN ORGANIZED HEALTH CARE EDUCATION/TRAINING PROGRAM

## 2024-11-01 PROCEDURE — 80053 COMPREHEN METABOLIC PANEL: CPT | Performed by: STUDENT IN AN ORGANIZED HEALTH CARE EDUCATION/TRAINING PROGRAM

## 2024-11-01 PROCEDURE — 81025 URINE PREGNANCY TEST: CPT | Performed by: EMERGENCY MEDICINE

## 2024-11-01 PROCEDURE — 99283 EMERGENCY DEPT VISIT LOW MDM: CPT

## 2024-11-01 PROCEDURE — 80047 BASIC METABLC PNL IONIZED CA: CPT

## 2024-11-01 PROCEDURE — 81003 URINALYSIS AUTO W/O SCOPE: CPT | Performed by: STUDENT IN AN ORGANIZED HEALTH CARE EDUCATION/TRAINING PROGRAM

## 2024-11-01 PROCEDURE — 84443 ASSAY THYROID STIM HORMONE: CPT | Performed by: STUDENT IN AN ORGANIZED HEALTH CARE EDUCATION/TRAINING PROGRAM

## 2024-11-01 NOTE — DISCHARGE INSTRUCTIONS
You were seen today for generalized weakness and fatigue.  Your labs today are all reassuring including your hemoglobin.  There is no anemia on your labs.  You should continue to rest and hydrate well at home.  Please return to the emergency department for any new or worsening symptoms.  You should follow up with your primary care doctor within the next week.

## 2024-11-01 NOTE — ED PROVIDER NOTES
Encounter Date: 11/1/2024       History     Chief Complaint   Patient presents with    Fatigue     Arrives via EMS with c/o generalized weakness and decreased appetite x2 days hx of anemia      HPI  Patient is a 24-year-old female with history of PTSD, previous homelessness now living in an apartment, anemia who presents for generalized weakness.  Patient states that she woke up today feeling generally weak and fatigued.  She reports feeling cold.  She was concerned that she might be anemic.  She is currently on her menstrual cycle but denies any heavy bleeding.  She was not had to change a pad or tampon today.  No rectal bleeding.  No other reports of bleeding.  She also states that she has had decreased appetite over the last 2 days but was still able to make it eat fried chicken last night.  No abdominal pain, nausea, vomiting, diarrhea, constipation, chest pain, shortness of breath, cough, congestion, rhinorrhea, sore throat, headache, dysuria, hematuria, abnormal vaginal discharge.    Review of patient's allergies indicates:   Allergen Reactions    Triamcinolone Swelling    Tomato Other (See Comments)     Tongue burning     History reviewed. No pertinent past medical history.  History reviewed. No pertinent surgical history.  Family History   Problem Relation Name Age of Onset    No Known Problems Mother      No Known Problems Father       Social History     Tobacco Use    Smoking status: Never    Smokeless tobacco: Never   Substance Use Topics    Alcohol use: Not Currently    Drug use: Never     Review of Systems  Full review of systems was obtained, see HPI for pertinent positives.    Physical Exam     Initial Vitals [11/01/24 1018]   BP Pulse Resp Temp SpO2   (!) 110/56 90 16 98.4 °F (36.9 °C) 98 %      MAP       --         Physical Exam  Constitutional: No acute distress, well-appearing, nontoxic  HENT: Normocephalic, atraumatic, moist mucous membranes  Neck: Supple, normal range of motion  Respiratory:  Non-labored, lungs clear  Cardiovascular: Well perfused, normal rate, regular rhythm  Gastrointestinal: Soft, non-tender, non-distended  Integumentary: Warm and dry  Musculoskeletal: No deformity, no unilateral leg swelling, warmth or erythema  Neurological: Awake and alert, normal motor  Psychiatric: Cooperative     ED Course   Procedures  Labs Reviewed   CBC W/ AUTO DIFFERENTIAL - Abnormal       Result Value    WBC 4.38      RBC 4.07      Hemoglobin 12.5      Hematocrit 37.6      MCV 92      MCH 30.7      MCHC 33.2      RDW 11.3 (*)     Platelets 226      MPV 10.5      Immature Granulocytes 0.2      Gran # (ANC) 2.5      Immature Grans (Abs) 0.01      Lymph # 1.4      Mono # 0.3      Eos # 0.1      Baso # 0.02      nRBC 0      Gran % 57.1      Lymph % 32.4      Mono % 6.6      Eosinophil % 3.2      Basophil % 0.5      Differential Method Automated     COMPREHENSIVE METABOLIC PANEL - Abnormal    Sodium 136      Potassium 4.2      Chloride 108      CO2 21 (*)     Glucose 78      BUN 12      Creatinine 0.8      Calcium 9.7      Total Protein 7.5      Albumin 4.2      Total Bilirubin 0.4      Alkaline Phosphatase 53      AST 19      ALT 12      eGFR >60.0      Anion Gap 7 (*)    URINALYSIS, REFLEX TO URINE CULTURE - Abnormal    Specimen UA Urine, Clean Catch      Color, UA Yellow      Appearance, UA Clear      pH, UA 6.0      Specific Gravity, UA >=1.030 (*)     Protein, UA Trace (*)     Glucose, UA Negative      Ketones, UA 3+ (*)     Bilirubin (UA) Negative      Occult Blood UA Negative      Nitrite, UA Negative      Leukocytes, UA Negative      Narrative:     Specimen Source->Urine   ISTAT PROCEDURE - Abnormal    POC Glucose 74      POC BUN 12      POC Creatinine 0.6      POC Sodium 140      POC Potassium 3.8      POC Chloride 106      POC TCO2 (MEASURED) 20 (*)     POC Ionized Calcium 1.25      POC Hematocrit 34 (*)     Sample CLOVER     TSH    TSH 0.533     MAGNESIUM    Magnesium 2.0     SARS-COV-2 RNA  AMPLIFICATION, QUAL    SARS-CoV-2 RNA, Amplification, Qual Negative     POCT URINE PREGNANCY    POC Preg Test, Ur Negative       Acceptable Yes     ISTAT CHEM8          Imaging Results    None          Medications - No data to display  Medical Decision Making  Patient here for generalized weakness, fatigue and decreased appetite.  Overall her vitals and exam are very reassuring.  She states that she thinks that she might be anemic she has been cold.  Will check basic labs including CBC to evaluate for anemia.  Will check thyroid.  Symptoms could be viral although she does not have any other URI symptoms but will check COVID swab.  Will p.o. hydrate here well in the emergency department.    Labs reviewed and reassuring.  She has a normal hemoglobin.  She was instructed to hydrate well at home and rest.  She was advised to follow up closely with her primary care doctor and counseled on return precautions prior to discharge.    Amount and/or Complexity of Data Reviewed  Labs: ordered.                                      Clinical Impression:  Final diagnoses:  [R53.83] Fatigue, unspecified type (Primary)          ED Disposition Condition    Discharge Stable          ED Prescriptions    None       Follow-up Information       Follow up With Specialties Details Why Contact Info    Dante Machuca  Schedule an appointment as soon as possible for a visit   [address]  [facility] [address]  [phone]      [facility] - Emergency Dept Emergency Medicine  As needed, If symptoms worsen [address]  [facility] [id number] [phone]             Yaneli Ortiz MD  11/01/24 4529

## 2024-11-01 NOTE — ED NOTES
Patient identifiers verified and correct for Annie Cheek  LOC: The patient is awake, alert and aware of environment with an appropriate affect, the patient is oriented x 3 and speaking appropriately.   APPEARANCE: Patient appears anxious.  Tearful at times.  .  SKIN: The skin is warm and dry, color consistent with ethnicity, patient has normal skin turgor and moist mucus membranes, skin intact, no breakdown or bruising noted.   MUSCULOSKELETAL: Patient moving all extremities spontaneously, no swelling noted.  GASTRO: Soft.  Generalized discomfort.   : Pt denies any pain or frequency with urination.  States having vaginal bleeding.   NEURO: Pt opens eyes spontaneously, behavior appropriate to situation, follows commands, facial expression symmetrical, bilateral hand grasp equal and even, purposeful motor response noted, normal sensation in all extremities when touched with a finger.
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
## 2024-11-01 NOTE — ED NOTES
I-STAT Chem-8+ Results:   Value Reference Range   Sodium 140 136-145 mmol/L   Potassium  3.8 3.5-5.1 mmol/L   Chloride 106  mmol/L   Ionized Calcium 1.25 1.06-1.42 mmol/L   CO2 (measured) 20 23-29 mmol/L   Glucose 74  mg/dL   BUN 12 6-30 mg/dL   Creatinine 0.6 0.5-1.4 mg/dL   Hematocrit 34 36-54%

## 2024-11-12 ENCOUNTER — HOSPITAL ENCOUNTER (EMERGENCY)
Facility: HOSPITAL | Age: 24
Discharge: HOME | End: 2024-11-12
Attending: EMERGENCY MEDICINE
Payer: MEDICAID

## 2024-11-12 VITALS
WEIGHT: 120 LBS | TEMPERATURE: 96 F | SYSTOLIC BLOOD PRESSURE: 103 MMHG | BODY MASS INDEX: 19.99 KG/M2 | HEART RATE: 82 BPM | DIASTOLIC BLOOD PRESSURE: 57 MMHG | HEIGHT: 65 IN | OXYGEN SATURATION: 99 % | RESPIRATION RATE: 16 BRPM

## 2024-11-12 DIAGNOSIS — F41.9 ANXIETY: Primary | ICD-10-CM

## 2024-11-12 DIAGNOSIS — F41.0 PANIC ATTACK: ICD-10-CM

## 2024-11-12 DIAGNOSIS — N30.00 ACUTE CYSTITIS WITHOUT HEMATURIA: ICD-10-CM

## 2024-11-12 LAB
B-HCG UR QL: NEGATIVE
BACTERIA #/AREA URNS AUTO: ABNORMAL /HPF
BILIRUB UR QL STRIP: NEGATIVE
CLARITY UR REFRACT.AUTO: ABNORMAL
COLOR UR AUTO: YELLOW
CTP QC/QA: YES
GLUCOSE UR QL STRIP: NEGATIVE
HGB UR QL STRIP: NEGATIVE
HYALINE CASTS UR QL AUTO: 0 /LPF
KETONES UR QL STRIP: ABNORMAL
LEUKOCYTE ESTERASE UR QL STRIP: ABNORMAL
MICROSCOPIC COMMENT: ABNORMAL
NITRITE UR QL STRIP: NEGATIVE
PH UR STRIP: 6 [PH] (ref 5–8)
PROT UR QL STRIP: ABNORMAL
RBC #/AREA URNS AUTO: 2 /HPF (ref 0–4)
SP GR UR STRIP: >1.03 (ref 1–1.03)
SQUAMOUS #/AREA URNS AUTO: 7 /HPF
URN SPEC COLLECT METH UR: ABNORMAL
WBC #/AREA URNS AUTO: 7 /HPF (ref 0–5)

## 2024-11-12 PROCEDURE — 25000003 PHARM REV CODE 250: Performed by: STUDENT IN AN ORGANIZED HEALTH CARE EDUCATION/TRAINING PROGRAM

## 2024-11-12 PROCEDURE — 81025 URINE PREGNANCY TEST: CPT | Performed by: STUDENT IN AN ORGANIZED HEALTH CARE EDUCATION/TRAINING PROGRAM

## 2024-11-12 PROCEDURE — 99284 EMERGENCY DEPT VISIT MOD MDM: CPT

## 2024-11-12 PROCEDURE — 81001 URINALYSIS AUTO W/SCOPE: CPT | Performed by: STUDENT IN AN ORGANIZED HEALTH CARE EDUCATION/TRAINING PROGRAM

## 2024-11-12 RX ORDER — LORAZEPAM 1 MG/1
1 TABLET ORAL
Status: COMPLETED | OUTPATIENT
Start: 2024-11-12 | End: 2024-11-12

## 2024-11-12 RX ORDER — CEPHALEXIN 500 MG/1
500 CAPSULE ORAL
Status: COMPLETED | OUTPATIENT
Start: 2024-11-12 | End: 2024-11-12

## 2024-11-12 RX ORDER — HYDROXYZINE HYDROCHLORIDE 25 MG/1
25 TABLET, FILM COATED ORAL 3 TIMES DAILY PRN
Qty: 12 TABLET | Refills: 0 | Status: SHIPPED | OUTPATIENT
Start: 2024-11-12 | End: 2024-11-12

## 2024-11-12 RX ORDER — CEPHALEXIN 500 MG/1
500 CAPSULE ORAL 2 TIMES DAILY
Qty: 10 CAPSULE | Refills: 0 | Status: SHIPPED | OUTPATIENT
Start: 2024-11-12 | End: 2024-11-17

## 2024-11-12 RX ORDER — HYDROXYZINE HYDROCHLORIDE 25 MG/1
25 TABLET, FILM COATED ORAL 3 TIMES DAILY PRN
Qty: 12 TABLET | Refills: 0 | Status: SHIPPED | OUTPATIENT
Start: 2024-11-12

## 2024-11-12 RX ORDER — CEPHALEXIN 500 MG/1
500 CAPSULE ORAL 2 TIMES DAILY
Qty: 10 CAPSULE | Refills: 0 | Status: SHIPPED | OUTPATIENT
Start: 2024-11-12 | End: 2024-11-12

## 2024-11-12 RX ADMIN — LORAZEPAM 1 MG: 1 TABLET ORAL at 07:11

## 2024-11-12 RX ADMIN — CEPHALEXIN 500 MG: 500 CAPSULE ORAL at 08:11

## 2024-11-12 NOTE — ED TRIAGE NOTES
Patient identifiers for Rebecca Cheek 24 y.o. female checked and correct.  Chief Complaint   Patient presents with    Anxiety     Has not had medication for several days. Gave EMS different name/info     History reviewed. No pertinent past medical history.  Allergies reported:   Review of patient's allergies indicates:   Allergen Reactions    Triamcinolone Swelling    Tomato Other (See Comments)     Tongue burning
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
## 2024-11-12 NOTE — ED PROVIDER NOTES
Encounter Date: 11/12/2024       History     Chief Complaint   Patient presents with    Anxiety     Has not had medication for several days. Gave EMS different name/info     HPI    Patient is a 24-year-old female with history of PTSD, previous homelessness presenting for anxiety.     She reports waking up in the middle of the night having a panic attack.  She endorses shortness of breath, hyperventilation, anxiety, and crying.  She called EMS for herself.    She notes yesterday was veterans day, and she was reflective and missing/mourning both of her fathers who have since passed away.  She has complaints of anxiety, tearfulness, but denies SI, HI, AVH.  She is a college student at [employer] and has concerns for financial resources and bus fare.  She denies abdominal pain, nausea, vomiting, fever, chills.  She does have polyuria and dysuria x2 days.  She denies EtOH or polysubstance abuse.  She was taking Zoloft remotely but does not like the side effect profile in his not taking anything on a daily basis for mental health.        Review of patient's allergies indicates:   Allergen Reactions    Triamcinolone Swelling    Tomato Other (See Comments)     Tongue burning     History reviewed. No pertinent past medical history.  History reviewed. No pertinent surgical history.  Family History   Problem Relation Name Age of Onset    No Known Problems Mother      No Known Problems Father       Social History     Tobacco Use    Smoking status: Never    Smokeless tobacco: Never   Substance Use Topics    Alcohol use: Not Currently    Drug use: Never     Review of Systems  See HPI for pertinent ROS.   Physical Exam     Initial Vitals [11/12/24 0616]   BP Pulse Resp Temp SpO2   112/78 88 18 98.6 °F (37 °C) 99 %      MAP       --         Physical Exam    Constitutional: She appears well-developed and well-nourished.   HENT:   Head: Normocephalic and atraumatic.   Eyes: Conjunctivae are normal. No scleral icterus.   Neck: No JVD  present.   Cardiovascular:  Normal rate, regular rhythm and normal heart sounds.     Exam reveals no gallop and no friction rub.       No murmur heard.  Pulmonary/Chest: Breath sounds normal. No stridor. She has no wheezes. She has no rhonchi. She has no rales.   Abdominal: Abdomen is soft. There is no abdominal tenderness. There is no rebound and no guarding.   Musculoskeletal:         General: No tenderness or edema.     Neurological: She is alert.   Skin: Skin is warm. Capillary refill takes less than 2 seconds.   Psychiatric: She has a normal mood and affect.   Crying and hyperventilating on initial evaluation.  Negative [facility]         ED Course   Procedures  Labs Reviewed   URINALYSIS, REFLEX TO URINE CULTURE - Abnormal       Result Value    Specimen UA Urine, Clean Catch      Color, UA Yellow      Appearance, UA Hazy (*)     pH, UA 6.0      Specific Gravity, UA >1.030 (*)     Protein, UA 1+ (*)     Glucose, UA Negative      Ketones, UA 1+ (*)     Bilirubin (UA) Negative      Occult Blood UA Negative      Nitrite, UA Negative      Leukocytes, UA 1+ (*)     Narrative:     Specimen Source->Urine   URINALYSIS MICROSCOPIC - Abnormal    RBC, UA 2      WBC, UA 7 (*)     Bacteria Moderate (*)     Squam Epithel, UA 7      Hyaline Casts, UA 0      Microscopic Comment SEE COMMENT      Narrative:     Specimen Source->Urine   POCT URINE PREGNANCY    POC Preg Test, Ur Negative       Acceptable Yes            Imaging Results    None          Medications   LORazepam tablet 1 mg (1 mg Oral Given 11/12/24 0703)   cephALEXin capsule 500 mg (500 mg Oral Given 11/12/24 0843)     Medical Decision Making                              24-year-old female described as above presenting for panic attack in the middle of the night.  She was history of PTSD and her father he was a  passed away violently which was a trigger for her panic attack this morning.  On arrival, vital signs are stable, she was afebrile.  On  exam, lungs are clear to auscultation, abdomen is soft and nontender, inpatient is acutely anxious but has good insight.  She denies SI, HI, AVH.  She was given 1 mg of Ativan to help with her panic attack.  She was not pregnant.  Urinalysis notable for borderline UTI but given patient reports polyuria and dysuria x2 days, we will move forward with treatment.  After the Ativan, panic attack resolved, patient clear and coherent, still denies SI, HI, AVH.  She has follow up with a mental health physician, she was also given mental health resource sheet for [facility].  She was discharged in stable condition.  She does not meet inpatient psychiatric requirements, she was not appear to be acutely psychotic or gravely disabled at this time.  Strict return to ER precautions were discussed and she was discharged.        Clinical Impression:  Final diagnoses:  [F41.9] Anxiety (Primary)  [F41.0] Panic attack  [N30.00] Acute cystitis without hematuria          ED Disposition Condition    Discharge Stable          ED Prescriptions       Medication Sig Dispense Start Date End Date Auth. Provider    cephALEXin (KEFLEX) 500 MG capsule  (Status: Discontinued) Take 1 capsule (500 mg total) by mouth 2 (two) times a day. for 5 days 10 capsule 11/12/2024 11/12/2024 Carolina Acuña MD    hydrOXYzine HCL (ATARAX) 25 MG tablet  (Status: Discontinued) Take 1 tablet (25 mg total) by mouth 3 (three) times daily as needed for Anxiety. 12 tablet 11/12/2024 11/12/2024 Carolina Acuña MD    cephALEXin (KEFLEX) 500 MG capsule Take 1 capsule (500 mg total) by mouth 2 (two) times a day. for 5 days 10 capsule 11/12/2024 11/17/2024 Carolina Acuña MD    hydrOXYzine HCL (ATARAX) 25 MG tablet Take 1 tablet (25 mg total) by mouth 3 (three) times daily as needed for Anxiety. 12 tablet 11/12/2024 -- Carolina Acuña MD          Follow-up Information       Follow up With Specialties Details Why Contact Info    [facility]     [address]  [facility] [address]  [phone]      [facility] - Emergency Dept Emergency Medicine Go to  If symptoms worsen [address]  [facility] [id number] [phone]             Carolina Acuña MD  Resident  11/12/24 0999

## 2024-11-13 NOTE — PLAN OF CARE
D/c today  Care plan discussed  Requesting assistance to transport home  Provide bus fare per request

## 2025-01-30 ENCOUNTER — HOSPITAL ENCOUNTER (EMERGENCY)
Facility: HOSPITAL | Age: 25
Discharge: HOME | End: 2025-01-30
Attending: EMERGENCY MEDICINE
Payer: MEDICAID

## 2025-01-30 VITALS
TEMPERATURE: 99 F | HEIGHT: 65 IN | WEIGHT: 120 LBS | RESPIRATION RATE: 16 BRPM | HEART RATE: 64 BPM | OXYGEN SATURATION: 100 % | DIASTOLIC BLOOD PRESSURE: 66 MMHG | SYSTOLIC BLOOD PRESSURE: 100 MMHG | BODY MASS INDEX: 19.99 KG/M2

## 2025-01-30 DIAGNOSIS — B96.89 BACTERIAL VAGINOSIS: Primary | ICD-10-CM

## 2025-01-30 DIAGNOSIS — N76.0 BACTERIAL VAGINOSIS: Primary | ICD-10-CM

## 2025-01-30 LAB
ALBUMIN SERPL BCP-MCNC: 4.2 G/DL (ref 3.5–5.2)
ALP SERPL-CCNC: 49 U/L (ref 40–150)
ALT SERPL W/O P-5'-P-CCNC: 13 U/L (ref 10–44)
ANION GAP SERPL CALC-SCNC: 7 MMOL/L (ref 8–16)
AST SERPL-CCNC: 17 U/L (ref 10–40)
B-HCG UR QL: NEGATIVE
BACTERIA #/AREA URNS AUTO: NORMAL /HPF
BACTERIA GENITAL QL WET PREP: ABNORMAL
BASOPHILS # BLD AUTO: 0.03 K/UL (ref 0–0.2)
BASOPHILS NFR BLD: 0.5 % (ref 0–1.9)
BILIRUB SERPL-MCNC: 0.4 MG/DL (ref 0.1–1)
BILIRUB UR QL STRIP: NEGATIVE
BUN SERPL-MCNC: 14 MG/DL (ref 6–20)
C TRACH DNA SPEC QL NAA+PROBE: NOT DETECTED
CALCIUM SERPL-MCNC: 9.2 MG/DL (ref 8.7–10.5)
CHLORIDE SERPL-SCNC: 106 MMOL/L (ref 95–110)
CLARITY UR REFRACT.AUTO: CLEAR
CLUE CELLS VAG QL WET PREP: ABNORMAL
CO2 SERPL-SCNC: 22 MMOL/L (ref 23–29)
COLOR UR AUTO: YELLOW
CREAT SERPL-MCNC: 0.8 MG/DL (ref 0.5–1.4)
CTP QC/QA: YES
DIFFERENTIAL METHOD BLD: ABNORMAL
EOSINOPHIL # BLD AUTO: 0.1 K/UL (ref 0–0.5)
EOSINOPHIL NFR BLD: 2.1 % (ref 0–8)
ERYTHROCYTE [DISTWIDTH] IN BLOOD BY AUTOMATED COUNT: 11.1 % (ref 11.5–14.5)
EST. GFR  (NO RACE VARIABLE): >60 ML/MIN/1.73 M^2
FILAMENT FUNGI VAG WET PREP-#/AREA: ABNORMAL
GLUCOSE SERPL-MCNC: 74 MG/DL (ref 70–110)
GLUCOSE UR QL STRIP: NEGATIVE
HCT VFR BLD AUTO: 38.2 % (ref 37–48.5)
HCV AB SERPL QL IA: NORMAL
HGB BLD-MCNC: 12.8 G/DL (ref 12–16)
HGB UR QL STRIP: ABNORMAL
HIV 1+2 AB+HIV1 P24 AG SERPL QL IA: NORMAL
IMM GRANULOCYTES # BLD AUTO: 0.03 K/UL (ref 0–0.04)
IMM GRANULOCYTES NFR BLD AUTO: 0.5 % (ref 0–0.5)
KETONES UR QL STRIP: ABNORMAL
LEUKOCYTE ESTERASE UR QL STRIP: NEGATIVE
LYMPHOCYTES # BLD AUTO: 1.6 K/UL (ref 1–4.8)
LYMPHOCYTES NFR BLD: 25.5 % (ref 18–48)
MCH RBC QN AUTO: 31 PG (ref 27–31)
MCHC RBC AUTO-ENTMCNC: 33.5 G/DL (ref 32–36)
MCV RBC AUTO: 93 FL (ref 82–98)
MICROSCOPIC COMMENT: NORMAL
MONOCYTES # BLD AUTO: 0.4 K/UL (ref 0.3–1)
MONOCYTES NFR BLD: 6.2 % (ref 4–15)
N GONORRHOEA DNA SPEC QL NAA+PROBE: NOT DETECTED
NEUTROPHILS # BLD AUTO: 4 K/UL (ref 1.8–7.7)
NEUTROPHILS NFR BLD: 65.2 % (ref 38–73)
NITRITE UR QL STRIP: NEGATIVE
NRBC BLD-RTO: 0 /100 WBC
PH UR STRIP: 6 [PH] (ref 5–8)
PLATELET # BLD AUTO: 229 K/UL (ref 150–450)
PMV BLD AUTO: 10.3 FL (ref 9.2–12.9)
POTASSIUM SERPL-SCNC: 4 MMOL/L (ref 3.5–5.1)
PROT SERPL-MCNC: 8.1 G/DL (ref 6–8.4)
PROT UR QL STRIP: ABNORMAL
RBC # BLD AUTO: 4.13 M/UL (ref 4–5.4)
RBC #/AREA URNS AUTO: 2 /HPF (ref 0–4)
SODIUM SERPL-SCNC: 135 MMOL/L (ref 136–145)
SP GR UR STRIP: >1.03 (ref 1–1.03)
SPECIMEN SOURCE: ABNORMAL
SQUAMOUS #/AREA URNS AUTO: 5 /HPF
T VAGINALIS GENITAL QL WET PREP: ABNORMAL
URN SPEC COLLECT METH UR: ABNORMAL
WBC # BLD AUTO: 6.12 K/UL (ref 3.9–12.7)
WBC #/AREA URNS AUTO: 1 /HPF (ref 0–5)
WBC #/AREA VAG WET PREP: ABNORMAL
YEAST GENITAL QL WET PREP: ABNORMAL

## 2025-01-30 PROCEDURE — 80053 COMPREHEN METABOLIC PANEL: CPT

## 2025-01-30 PROCEDURE — 87210 SMEAR WET MOUNT SALINE/INK: CPT

## 2025-01-30 PROCEDURE — 87491 CHLMYD TRACH DNA AMP PROBE: CPT

## 2025-01-30 PROCEDURE — 81025 URINE PREGNANCY TEST: CPT | Performed by: EMERGENCY MEDICINE

## 2025-01-30 PROCEDURE — 99284 EMERGENCY DEPT VISIT MOD MDM: CPT

## 2025-01-30 PROCEDURE — 81001 URINALYSIS AUTO W/SCOPE: CPT

## 2025-01-30 PROCEDURE — 85025 COMPLETE CBC W/AUTO DIFF WBC: CPT

## 2025-01-30 PROCEDURE — 86803 HEPATITIS C AB TEST: CPT | Performed by: PHYSICIAN ASSISTANT

## 2025-01-30 PROCEDURE — 87389 HIV-1 AG W/HIV-1&-2 AB AG IA: CPT | Performed by: PHYSICIAN ASSISTANT

## 2025-01-30 RX ORDER — METRONIDAZOLE 500 MG/1
500 TABLET ORAL EVERY 12 HOURS
Qty: 14 TABLET | Refills: 0 | Status: SHIPPED | OUTPATIENT
Start: 2025-01-30 | End: 2025-01-30

## 2025-01-30 RX ORDER — MIRTAZAPINE 7.5 MG/1
7.5 TABLET, FILM COATED ORAL NIGHTLY
COMMUNITY

## 2025-01-30 RX ORDER — METRONIDAZOLE 500 MG/1
500 TABLET ORAL EVERY 12 HOURS
Qty: 14 TABLET | Refills: 0 | Status: SHIPPED | OUTPATIENT
Start: 2025-01-30 | End: 2025-02-06

## 2025-01-30 NOTE — ED NOTES
Patient identifiers verified and correct for  Ms Cheek   C/C: Abd pain SEE NN  APPEARANCE: awake and alert in NAD. PAIN  10/10  SKIN: warm, dry and intact. No breakdown or bruising.  MUSCULOSKELETAL: Patient moving all extremities spontaneously, no obvious swelling or deformities noted. Ambulates independently.  RESPIRATORY: Denies shortness of breath.Respirations unlabored.   CARDIAC: Denies CP, 2+ distal pulses; no peripheral edema  ABDOMEN: ABdomen soft, denies nausea,   : voids spontaneously, denies difficulty  Neurologic: AAO x 4; follows commands equal strength in all extremities; denies numbness/tingling. Denies dizziness  Denies new wekaness

## 2025-01-30 NOTE — ED PROVIDER NOTES
Encounter Date: 1/30/2025       History     Chief Complaint   Patient presents with    Vaginal Bleeding     Arrives via EMS with reports of abdominal pain and vaginal bleeding x3 days.      24-year-old female history of eczema PTSD presents emergency department due to vaginal discomfort.  Patient states over the last 7 days experiencing light intermittent vaginal bleeding.  However she is concerned as her LMP was less than a month ago.  She also endorses pelvic pain as well.  Patient is sexually active and requesting STI testing.  That has also endorsing dysuria.  Patient is not develop fever or chills.  No nausea or vomiting.  Tolerating p.o. without difficulty.  No bowel changes such as constipation or diarrhea.        Review of patient's allergies indicates:   Allergen Reactions    Triamcinolone Swelling    Tomato Other (See Comments)     Tongue burning     Past Medical History:   Diagnosis Date    Eczema     PTSD (post-traumatic stress disorder)      History reviewed. No pertinent surgical history.  Family History   Problem Relation Name Age of Onset    No Known Problems Mother      No Known Problems Father       Social History     Tobacco Use    Smoking status: Never    Smokeless tobacco: Never   Substance Use Topics    Alcohol use: Not Currently    Drug use: Never     Review of Systems  See HPI  Physical Exam     Initial Vitals [01/30/25 0720]   BP Pulse Resp Temp SpO2   102/74 78 20 98.4 °F (36.9 °C) 100 %      MAP       --         Physical Exam    Vitals reviewed.  Constitutional: She appears well-developed and well-nourished.   HENT:   Head: Normocephalic and atraumatic.   Eyes: Conjunctivae and EOM are normal.   Neck:   Normal range of motion.  Cardiovascular:  Normal rate.           Pulmonary/Chest: No respiratory distress.   Abdominal: Abdomen is soft. She exhibits no distension.   Genitourinary:    Genitourinary Comments: Chaperoned pelvic exam performed  On vaginal exam there is a thin white  vaginal discharge present in moderate amount.  The cervix is slightly erythematous with what appears to be a cyst, nontender.  No other rashes or lesions identified.  No CMT.     Musculoskeletal:         General: Normal range of motion.      Cervical back: Normal range of motion.     Neurological: She is alert and oriented to person, place, and time.   Skin: Skin is warm and dry.   Psychiatric: She has a normal mood and affect. Thought content normal.         ED Course   Procedures  Labs Reviewed   VAGINAL SCREEN - Abnormal       Result Value    Trichomonas None      Clue Cells Many (*)     Budding Yeast None      Fungal Hyphae None      WBC - Vaginal Screen Occasional (*)     Bacteria - Vaginal Screen Occasional (*)     Wet Prep Source Vagina      Narrative:     Release to patient->Immediate   URINALYSIS, REFLEX TO URINE CULTURE - Abnormal    Specimen UA Urine, Clean Catch      Color, UA Yellow      Appearance, UA Clear      pH, UA 6.0      Specific Gravity, UA >1.030 (*)     Protein, UA Trace (*)     Glucose, UA Negative      Ketones, UA 2+ (*)     Bilirubin (UA) Negative      Occult Blood UA 2+ (*)     Nitrite, UA Negative      Leukocytes, UA Negative      Narrative:     Specimen Source->Urine   CBC W/ AUTO DIFFERENTIAL - Abnormal    WBC 6.12      RBC 4.13      Hemoglobin 12.8      Hematocrit 38.2      MCV 93      MCH 31.0      MCHC 33.5      RDW 11.1 (*)     Platelets 229      MPV 10.3      Immature Granulocytes 0.5      Gran # (ANC) 4.0      Immature Grans (Abs) 0.03      Lymph # 1.6      Mono # 0.4      Eos # 0.1      Baso # 0.03      nRBC 0      Gran % 65.2      Lymph % 25.5      Mono % 6.2      Eosinophil % 2.1      Basophil % 0.5      Differential Method Automated     COMPREHENSIVE METABOLIC PANEL - Abnormal    Sodium 135 (*)     Potassium 4.0      Chloride 106      CO2 22 (*)     Glucose 74      BUN 14      Creatinine 0.8      Calcium 9.2      Total Protein 8.1      Albumin 4.2      Total Bilirubin 0.4       Alkaline Phosphatase 49      AST 17      ALT 13      eGFR >60.0      Anion Gap 7 (*)    HEPATITIS C ANTIBODY    Hepatitis C Ab Non-reactive      Narrative:     Release to patient->Immediate   HIV 1 / 2 ANTIBODY    HIV 1/2 Ag/Ab Non-reactive      Narrative:     Release to patient->Immediate   C. TRACHOMATIS/N. GONORRHOEAE BY AMP DNA    Chlamydia, Amplified DNA Not Detected      N gonorrhoeae, amplified DNA Not Detected      Narrative:     Sources by Resulting Lab:->[facility]  Release to patient->Immediate   URINALYSIS MICROSCOPIC    RBC, UA 2      WBC, UA 1      Bacteria Rare      Squam Epithel, UA 5      Microscopic Comment SEE COMMENT      Narrative:     Specimen Source->Urine   POCT URINE PREGNANCY    POC Preg Test, Ur Negative       Acceptable Yes            Imaging Results    None          Medications - No data to display  Medical Decision Making  Patient has a 24-year-old female presenting to emergency department due to vaginal discomfort.  Differential diagnosis includes vaginitis, vaginal candidiasis, Trichomonas, bacterial vaginosis, gonorrhea/chlamydia no concern of PID based off physical exam  UA also negative for cystitis  Vaginal screen positive for bacterial vaginosis discussed medication usage and follow up with gyn as needed  Patient discharged home      Amount and/or Complexity of Data Reviewed  Labs: ordered. Decision-making details documented in ED Course.    Risk  Prescription drug management.               ED Course as of 01/31/25 1537   Thu Jan 30, 2025   1041 Clue Cells, Wet Prep(!): Many [CR]   1106 Clue Cells, Wet Prep(!): Many [CR]      ED Course User Index  [CR] Alisa Torres, ITALO                           Clinical Impression:  Final diagnoses:  [N76.0, B96.89] Bacterial vaginosis (Primary)          ED Disposition Condition    Discharge Stable          ED Prescriptions       Medication Sig Dispense Start Date End Date Auth. Provider    metroNIDAZOLE (FLAGYL) 500 MG  tablet  (Status: Discontinued) Take 1 tablet (500 mg total) by mouth every 12 (twelve) hours. for 7 days 14 tablet 1/30/2025 1/30/2025 Alisa Torres PA-C    metroNIDAZOLE (FLAGYL) 500 MG tablet Take 1 tablet (500 mg total) by mouth every 12 (twelve) hours. for 7 days. DO MOT CONSUME ALCOHOL FOR UP TO 72 HOURS AFTER LAST DOSE. 14 tablet 1/30/2025 2/6/2025 Alisa Torres PA-C          Follow-up Information       Follow up With Specialties Details Why Contact Info    [facility]  Schedule an appointment as soon as possible for a visit   [address] [facility] [address]  [phone]      [facility] - Women's Obstetrics, Obstetrics and Gynecology, Gynecology Schedule an appointment as soon as possible for a visit   [address] [facility] [address]  [phone]               Alisa Torres PA-C  01/31/25 1537

## 2025-01-30 NOTE — DISCHARGE INSTRUCTIONS
As discussed please follow up with a gynecologist.  Do not have a urine infection  However you tested positive for bacterial vaginosis I have provided information regarding this diagnosis.  Take medication as prescribed do not drink alcohol as it can cause a negative reaction.

## 2025-01-30 NOTE — ED NOTES
Patient states she arrived via EMS with concerns of lower abd pain ont he last snow day, states she is not on her period, reports that she has orregular periods

## 2025-08-04 ENCOUNTER — OCCUPATIONAL HEALTH (OUTPATIENT)
Dept: URGENT CARE | Facility: CLINIC | Age: 25
End: 2025-08-04

## 2025-08-04 DIAGNOSIS — Z13.9 ENCOUNTER FOR SCREENING: Primary | ICD-10-CM

## 2025-08-04 PROCEDURE — 86580 TB INTRADERMAL TEST: CPT | Mod: S$GLB,,, | Performed by: NURSE PRACTITIONER

## 2025-08-07 ENCOUNTER — OCCUPATIONAL HEALTH (OUTPATIENT)
Dept: URGENT CARE | Facility: CLINIC | Age: 25
End: 2025-08-07

## 2025-08-07 DIAGNOSIS — Z13.9 ENCOUNTER FOR SCREENING: Primary | ICD-10-CM

## 2025-08-11 ENCOUNTER — TELEPHONE (OUTPATIENT)
Dept: URGENT CARE | Facility: CLINIC | Age: 25
End: 2025-08-11
Payer: MEDICAID